# Patient Record
Sex: MALE | Race: OTHER | Employment: UNEMPLOYED | URBAN - METROPOLITAN AREA
[De-identification: names, ages, dates, MRNs, and addresses within clinical notes are randomized per-mention and may not be internally consistent; named-entity substitution may affect disease eponyms.]

---

## 2017-03-08 ENCOUNTER — HOSPITAL ENCOUNTER (OUTPATIENT)
Dept: CT IMAGING | Facility: HOSPITAL | Age: 42
Discharge: HOME | End: 2017-03-08
Attending: FAMILY MEDICINE

## 2017-03-08 VITALS — SYSTOLIC BLOOD PRESSURE: 147 MMHG | DIASTOLIC BLOOD PRESSURE: 100 MMHG | HEART RATE: 79 BPM

## 2017-03-08 DIAGNOSIS — Z13.220 SCREENING CHOLESTEROL LEVEL: ICD-10-CM

## 2017-03-08 NOTE — IMAGING NOTE
UNABLE TO CALCULATE FS, HAS NO RECALL OF CHOL LEVELS IF EVEN DONE. REFERRED HIM TO FOLLOW UP WITH PMD FOR CHOL, B/P WHICH IS ELEVATED. . WEIGHT ADDITIONAL RISK